# Patient Record
Sex: FEMALE | Race: WHITE
[De-identification: names, ages, dates, MRNs, and addresses within clinical notes are randomized per-mention and may not be internally consistent; named-entity substitution may affect disease eponyms.]

---

## 2019-12-06 ENCOUNTER — HOSPITAL ENCOUNTER (EMERGENCY)
Dept: HOSPITAL 75 - ER | Age: 44
Discharge: HOME | End: 2019-12-06
Payer: COMMERCIAL

## 2019-12-06 VITALS — WEIGHT: 201.06 LBS | BODY MASS INDEX: 34.75 KG/M2 | HEIGHT: 63.78 IN

## 2019-12-06 VITALS — DIASTOLIC BLOOD PRESSURE: 107 MMHG | SYSTOLIC BLOOD PRESSURE: 157 MMHG

## 2019-12-06 DIAGNOSIS — F41.9: ICD-10-CM

## 2019-12-06 DIAGNOSIS — F90.9: ICD-10-CM

## 2019-12-06 DIAGNOSIS — F43.10: ICD-10-CM

## 2019-12-06 DIAGNOSIS — F31.9: ICD-10-CM

## 2019-12-06 DIAGNOSIS — G43.909: ICD-10-CM

## 2019-12-06 DIAGNOSIS — M79.7: ICD-10-CM

## 2019-12-06 DIAGNOSIS — K21.9: ICD-10-CM

## 2019-12-06 DIAGNOSIS — I10: Primary | ICD-10-CM

## 2019-12-06 PROCEDURE — 99283 EMERGENCY DEPT VISIT LOW MDM: CPT

## 2019-12-06 NOTE — ED GENERAL
General


Chief Complaint:  Cardiac/General Problems


Stated Complaint:  HIGH BLOOD PRESSURE


Nursing Triage Note:  


PATIENT STATES THAT DR CALDERON SENT HER HERE FOR HIGH BLOOD PRESSURE, 


160S/100S. PATIENT STATES THAT SHE IS ASYMPTOMATIC.


Nursing Sepsis Screen:  No Definite Risk


Source of Information:  Patient, Caregiver


Exam Limitations:  No Limitations





History of Present Illness


Date Seen by Provider:  Dec 6, 2019


Time Seen by Provider:  15:27


Initial Comments


Patient has been sent to the emergency department because of a concern from her 

caregiver for elevated blood pressure.  Patient is on clonidine 0.2 mg daily.  

She had not taken her clonidine today.





Patient denies headache, stiff neck, photophobia, paresthesias or weakness in 

the extremities, palpitations or chest pain, shortness of breath, nausea 

vomiting or diarrhea.





The patient has her clonidine in the car and can take the medication as soon as 

she is discharge.





Allergies and Home Medications


Allergies


Coded Allergies:  


     No Known Drug Allergies (Unverified , 12/6/19)





Patient Home Medication List


Home Medication List Reviewed:  Yes





Review of Systems


Review of Systems


Constitutional:  No chills


EENTM:  No blurred vision, No vision loss


Respiratory:  No cough


Cardiovascular:  No chest pain


Gastrointestinal:  no symptoms reported


Genitourinary:  no symptoms reported


Musculoskeletal:  no symptoms reported


Skin:  no symptoms reported


Psychiatric/Neurological:  No Symptoms Reported


Hematologic/Lymphatic:  No Symptoms Reported


Immunological/Allergic:  no symptoms reported





Past Medical-Social-Family Hx


Past Med/Social Hx:  Reviewed Nursing Past Med/Soc Hx


Patient Social History


Alcohol Use:  Denies Use


Recreational Drug Use:  No


Smoking Status:  Never a Smoker


2nd Hand Smoke Exposure:  No


Recent Foreign Travel:  No


Contact w/Someone Who Travel:  No


Recent Infectious Disease Expo:  No





Past Medical History


Surgeries:  Yes (D&c, KIDNEY STONES)


Gallbladder


Respiratory:  No


Hypertension


Neurological:  Yes


Headaches /Migraines


Genitourinary:  No


Gastrointestinal:  Yes


Gastroesophageal Reflux


Musculoskeletal:  Yes


Degenerate Disk Disease, Fibromyalgia, Scoliosis, Chronic Back Pain, Spasms


Endocrine:  No


HEENT:  No


Psychosocial:  Yes


ADD/ADHD, Anxiety, PTSD, Bipolar, Depression





Physical Exam


Vital Signs





Vital Signs - First Documented








 12/6/19





 14:53


 


Temp 36.8


 


Pulse 126


 


Resp 20


 


B/P (MAP) 157/84 (108)


 


Pulse Ox 97





Capillary Refill : Less Than 3 Seconds


Height, Weight, BMI


Height: '"


Weight: lbs. oz. kg; 34.00 BMI


Method:


General Appearance:  No Apparent Distress, WD/WN, Anxious


Eyes:  Bilateral Eye Normal Inspection


HEENT:  Normal ENT Inspection


Neck:  Normal Inspection


Respiratory:  Lungs Clear


Cardiovascular:  Regular Rate, Rhythm


Gastrointestinal:  Normal Bowel Sounds


Back:  Normal Inspection


Extremity:  Normal Capillary Refill, Normal Inspection


Neurologic/Psychiatric:  Alert, No Motor/Sensory Deficits


Skin:  Normal Color, Warm/Dry





Progress/Results/Core Measures


Suspected Sepsis


Recent Fever Within 48 Hours:  No


Infection Criteria Present:  None


New/Unexplained  Altered Menta:  No


Sepsis Screen:  No Definite Risk


SIRS


Temperature: 


Pulse: 126 


Respiratory Rate: 20


 


Blood Pressure 157 /84 


Mean: 108





Results/Orders


My Orders





Orders - AVE DELA CRUZ MD


Clonidine  Tablet (Catapres  Tablet) (12/6/19 15:30)





Vital Signs/I&O











 12/6/19





 14:53


 


Temp 36.8


 


Pulse 126


 


Resp 20


 


B/P (MAP) 157/84 (108)


 


Pulse Ox 97





Capillary Refill : Less Than 3 Seconds








Blood Pressure Mean:                    108


POS





Progress Note :  


   Time:  15:30


Progress Note


Patient's blood pressure was 157/84.  I reassured the patient and told her that 

I thought she could reinitiate her clonidine 0.2 mg.  I asked her to follow-up 

edge as she had been instructed with her caregivers on Monday.  I invited her to

return to the emergency department she had any further problems or questions





Departure


Impression





   Primary Impression:  


   Elevated blood pressure reading


Disposition:  01 HOME, SELF-CARE


Condition:  Unchanged





Departure-Patient Inst.


Decision time for Depature:  15:31


Referrals:  


[facility] [place]


Patient Instructions:  High Blood Pressure in Adults











AVE DELA CRUZ MD              Dec 6, 2019 15:32


POS

## 2019-12-23 ENCOUNTER — HOSPITAL ENCOUNTER (OUTPATIENT)
Dept: HOSPITAL 75 - PREOP | Age: 44
Discharge: HOME | End: 2019-12-23
Attending: OBSTETRICS & GYNECOLOGY
Payer: MEDICAID

## 2019-12-23 VITALS — SYSTOLIC BLOOD PRESSURE: 124 MMHG | DIASTOLIC BLOOD PRESSURE: 84 MMHG

## 2019-12-23 VITALS — BODY MASS INDEX: 35.06 KG/M2 | WEIGHT: 202.83 LBS | HEIGHT: 63.78 IN

## 2019-12-23 DIAGNOSIS — Z01.818: Primary | ICD-10-CM

## 2019-12-23 PROCEDURE — 87081 CULTURE SCREEN ONLY: CPT
